# Patient Record
Sex: MALE | Race: WHITE | Employment: FULL TIME | URBAN - METROPOLITAN AREA
[De-identification: names, ages, dates, MRNs, and addresses within clinical notes are randomized per-mention and may not be internally consistent; named-entity substitution may affect disease eponyms.]

---

## 2020-07-28 ENCOUNTER — OFFICE VISIT (OUTPATIENT)
Dept: ORTHOPEDIC SURGERY | Age: 35
End: 2020-07-28
Payer: COMMERCIAL

## 2020-07-28 VITALS — BODY MASS INDEX: 25.84 KG/M2 | HEIGHT: 73 IN | WEIGHT: 195 LBS

## 2020-07-28 PROCEDURE — 99203 OFFICE O/P NEW LOW 30 MIN: CPT | Performed by: PHYSICIAN ASSISTANT

## 2020-07-28 NOTE — PROGRESS NOTES
CHIEF COMPLAINT:    Chief Complaint   Patient presents with    Elbow Injury     RIGHT medial elbow popped last Weds while benching; noticed indentation a few days later and feels posterior elbow has issue as well       HISTORY OF PRESENT ILLNESS:                The patient is a 37 y.o. male who presents to clinic for evaluation of right elbow pain. He had an injury last Wednesday when bench pressing. He states he was pushing the bar and follow-up pop in the elbow. He points to the distal tricep medially as the primary location of his pain. He has noticed a diastases at this area since the injury occurred. Over the past several days he is had some improvement in his pain but has not attempted to return to working out. History reviewed. No pertinent past medical history. The pain assessment was noted & is as follows:  Pain Assessment  Location of Pain: Knee  Location Modifiers: Right, Medial  Severity of Pain: 1  Quality of Pain: Dull  Duration of Pain: Persistent  Frequency of Pain: Intermittent  Aggravating Factors: Bending, Straightening, Other (Comment)(pushing; resisted elbow extension)  Limiting Behavior: Some  Relieving Factors: Rest]      Work Status/Functionality:     Past Medical History: Medical history form was reviewed today & can be found in the media tab  History reviewed. No pertinent past medical history. Past Surgical History:     Past Surgical History:   Procedure Laterality Date    EYE SURGERY      lasix surgery SUDHA eyes    KNEE ARTHROSCOPY       Current Medications:   No current outpatient medications on file. Allergies:  Ether  Social History:    reports that he has never smoked. He has never used smokeless tobacco.  Family History:   History reviewed. No pertinent family history. REVIEW OF SYSTEMS:   For new problems, a full review of systems will be found scanned in the patient's chart.   CONSTITUTIONAL: Denies unexplained weight loss, fevers, chills   NEUROLOGICAL: Denies unsteady gait or progressive weakness  SKIN: Denies skin changes, delayed healing, rash, itching       PHYSICAL EXAM:    Vitals: Height 6' 1\" (1.854 m), weight 195 lb (88.5 kg). GENERAL EXAM:  · General Apparence: Patient is adequately groomed with no evidence of malnutrition. · Orientation: The patient is oriented to time, place and person. · Mood & Affect:The patient's mood and affect are appropriate       Right elbow PHYSICAL EXAMINATION:  · Inspection: He does have a visible diastases at the distal tricep    · Palpation: Tenderness to palpation directly over the olecranon    · Range of Motion: He is able to perform full range of motion of the elbow    · Strength: He is able to resist me with tricep extension. This does reproduce pain    · Special Tests: Neurovascular exam is intact the distal extremity. Negative Tinel's testing at the elbow. · Skin:  There are no rashes, ulcerations or lesions. · There are no dysvascular changes     Gait & station: Normal      Additional Examinations:        Left Upper Extremity: Examination of the left upper extremity does not show any tenderness, deformity or injury. Range of motion is unremarkable. There is no gross instability. There are no rashes, ulcerations or lesions. Strength and tone are normal.      Diagnostic Testing: The following x rays were read and interpreted by myself      1.  3 x-rays of the right elbow were taken today and reveal ossification at the olecranon with no other acute bony abnormalities.     Orders     Orders Placed This Encounter   Procedures    XR ELBOW RIGHT (MIN 3 VIEWS)     Standing Status:   Future     Number of Occurrences:   1     Standing Expiration Date:   8/28/2020    MRI ELBOW RIGHT WO CONTRAST     Standing Status:   Future     Standing Expiration Date:   7/28/2021     Scheduling Instructions:      Ehsan Granados      [phone] [address] [place] [address]; [address] [place] [address] PUSH TO [facility] PACS            PATIENT TO CALL AND SCHEDULE WHEN SCAN APPROVED     Order Specific Question:   Reason for exam:     Answer:   R/O TRICEPS TEAR         Assessment / Treatment Plan:     1. Right elbow triceps strain versus tear    I discussed treatment options with the patient. Given his young age, history of injury with a pop and visible diastases today, I would like to obtain a MRI of the right elbow to rule out a tricep tendon a avulsion. I discussed with the patient I think that he has some fibers attached as he can provide me some resistance. He will follow-up with Dr. Mady Willson or Dr. Author Gr to review the results of his MRI, the extent of his injury and possible treatment options. Rin Thomas, [facility]    This dictation was performed with a verbal recognition program [facility]) and it was checked for errors. It is possible that there are still dictated errors within this office note. If so, please bring any errors to my attention for an addendum. All efforts were made to ensure that this office note is accurate.

## 2020-07-30 ENCOUNTER — TELEPHONE (OUTPATIENT)
Dept: ORTHOPEDIC SURGERY | Age: 35
End: 2020-07-30

## 2020-07-30 NOTE — TELEPHONE ENCOUNTER
LM on patients VM that MRI is approved. Order and Kelly New were faxed to facility, [facility]. Patient may call and schedule.

## 2020-08-19 ENCOUNTER — OFFICE VISIT (OUTPATIENT)
Dept: ORTHOPEDIC SURGERY | Age: 35
End: 2020-08-19
Payer: COMMERCIAL

## 2020-08-19 VITALS — HEIGHT: 73 IN | BODY MASS INDEX: 25.84 KG/M2 | WEIGHT: 195 LBS

## 2020-08-19 PROCEDURE — 99203 OFFICE O/P NEW LOW 30 MIN: CPT | Performed by: ORTHOPAEDIC SURGERY

## 2020-08-19 RX ORDER — CEPHALEXIN 500 MG/1
CAPSULE ORAL
COMMUNITY
Start: 2020-08-13

## 2020-08-19 RX ORDER — ISOTRETINOIN 20 MG/1
CAPSULE, LIQUID FILLED ORAL
COMMUNITY
Start: 2020-08-17

## 2020-08-19 NOTE — PROGRESS NOTES
Chief Complaint  No chief complaint on file. History of Present Illness:  Mary Carmen Boo is a 37 y.o. 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 He subsequently presented to after-hours and subsequently then was evaluated with suspicion for triceps tendon injury. MRI was performed in the interval on 8/6/2020 demonstrating partial thickness distal triceps tendon tear less than 50% thickness with enthesophyte and small avulsed cortical fragment with mild olecranon bursitis and small elbow joint effusion  The patient states that over the last several weeks since his last evaluation he is felt better but is not attempted any forceful extension or weight lifting exercises  No antecedent right elbow pain  No history of gout rheumatoid arthritis or other inflammatory arthropathy  He denies any anabolic steroid use  Medical History  Patient's medications, allergies, past medical, surgical, social and family histories were reviewed and updated as appropriate. Review of Systems  Pertinent items are noted in HPI  Review of systems reviewed from Patient History Form dated on 8/19/20 and available in the patient's chart under the Media tab. Vital Signs  There were no vitals filed for this visit. General Exam:   Constitutional: Patient is adequately groomed with no evidence of malnutrition  Mental Status: The patient is oriented to time, place and person. The patient's mood and affect are appropriate. Lymphatic: The lymphatic examination bilaterally reveals all areas to be without enlargement or induration. Neurological: The patient has good coordination. There is no weakness or sensory deficit.      Right Elbow/right upper extremity examination  Inspection: Minimal swelling near the posterior aspect of the elbow with area of small divot near the posterior medial aspect of the elbow without overlying skin changes  No swelling throughout the remainder of the elbow forearm or wrist    Palpation: Minimal tenderness to palpation at the triceps insertion with palpable gap near the posterior medial aspect of the elbow but no palpable gap spending the remainder of the triceps tendon insertion  Globally nontender throughout the remainder of the elbow    Range of Motion: Gentle elbow range of motion testing today 0 to 135 degrees of flexion without pain or crepitus  75 degrees of pronation and supination without crepitus or pain    Strength: 5 out of 5 strength with elbow flexion and extension including from the position of elbow hyperflexion to extension with gentle testing  5 out of 5 all major muscle groups C5-T1 without deficit    Special Tests: Gross sensation intact median ulnar radial nerve without deficit  Brisk capillary refill all fingers with 2+ palpable radial pulse  Negative Tinel's at cubital tunnel right elbow    Skin: There are no additional worrisome rashes, ulcerations or lesions. Gait: normal    Circulation normal    Additional Comments:     Additional Examinations:  Left Upper Extremity: Examination of the left upper extremity does not show any tenderness, deformity or injury. Range of motion is unremarkable. There is no gross instability. There are no rashes, ulcerations or lesions.   Strength and tone are normal.      Radiology:     X-rays obtained and reviewed in office:  No images obtained today    X-rays were reviewed from 7/20/2020 demonstrating enthesophyte and calcifications adjacent to the triceps insertion with what appears to be fragmentation but no evidence of obvious retracted ministerio of bone throughout the posterior elbow    MRI of the right elbow joint without contrast from 8/6/2020 demonstrating  Superficial partial-thickness tear distal triceps tendon insertion less than 50% thickness with suspicious small avulsed cortical fragment and mild olecranon bursitis and small joint effusion, images personally reviewed by me, please see media tab for full detailed report      Assessment: 15-year-old male presenting with history of right elbow injury while weightlifting  1. Right distal triceps partial thickness tendon tear suspected less than 50% with associated likely underlying calcific tendinopathy    Impression:  No diagnosis found. Office Procedures:  No orders of the defined types were placed in this encounter. Treatment Plan: I spoke with the patient today regarding his injury and images and correlating with his exam.  His exam does demonstrate overall strength without pain with resisted triceps extension today. It appears based on his MRI that he has less than 50% tendon tear with deep fibers intact. We had a long discussion today regarding treatment options including both operative and nonoperative and the risks and benefits of each treatment. Specifically we discussed with nonoperative treatment the need for a period of caution and avoiding any resisted extension over the next several months and referral to therapy which I think will be beneficial for some triceps stretching as well as some modalities for his partial tendon tear as well as education discussing some high risk activities that could lead to further injury. We also discussed the possibility of complete rupture with nonoperative treatment. Operative treatment could be considered based on his lifestyle but he states that he can avoid certain activities and exercises for now. He also understands that this would likely involve creating a complete tear and reattaching the triceps tendon. After thorough discussion the patient would like to pursue nonoperative treatment for now.   I have offered to continue to monitor his progress and follow-up in approximately 4 to 6 weeks for repeat evaluation

## 2020-10-07 ENCOUNTER — TELEMEDICINE (OUTPATIENT)
Dept: ORTHOPEDIC SURGERY | Age: 35
End: 2020-10-07
Payer: COMMERCIAL

## 2020-10-07 PROCEDURE — 99212 OFFICE O/P EST SF 10 MIN: CPT | Performed by: ORTHOPAEDIC SURGERY

## 2020-10-07 NOTE — PROGRESS NOTES
progression of activities including doing some lighter resisted triceps exercises close chain will be most appropriate for him  We did discuss avoiding some of that isolated triceps exercise that causes injury initially including bench press and overhead triceps extension and other types of open chain exercises  For now, patient will continue with otherwise progression of day-to-day activities as tolerated. He understands if he begins to have any new changes discomfort swelling stiffness or any other questions or concerns he will plan to call so that we can follow-up and schedule visit    This visit was completed virtually using [email or web address] me  Patient has verbally accepted the following: We confirm that, for purposes of billing, this is a virtual visit with the provider for which we will submit a claim for reimbursement with the insurance company. The patient accepts responsibility for any co-pays, coinsurance amounts or other amounts not covered by the insurance company. Patient location: Home  Physician location: [facility]    An  electronic signature was used to authenticate this note. --Danitza Adams MD on 10/7/20  12:06 PM         Myah Mata is a 38 y.o. male being evaluated by a Virtual Visit (video visit) encounter to address concerns as mentioned above. A caregiver was present when appropriate. Due to this being a TeleHealth encounter (During [id number]-84 public health emergency), evaluation of the following organ systems was limited: Vitals/Constitutional/EENT/Resp/CV/GI//MS/Neuro/Skin/Heme-Lymph-Imm. Pursuant to the emergency declaration under the [address] [place] [address], [address] [place] [address] authority and the [employer] and [employer] Act, this Virtual Visit was conducted with patient's (and/or legal guardian's) consent, to reduce the patient's risk of exposure to COVID-19 and provide necessary medical care.   The patient (and/or legal guardian) has also been advised to contact this office for worsening conditions or problems, and seek emergency medical treatment and/or call 911 if deemed necessary. Patient identification was verified at the start of the visit: Yes    Total time spent for this encounter: Not billed by time    Services were provided through a video synchronous discussion virtually to substitute for in-person clinic visit. Patient and provider were located at their individual homes. --Marylee Borg, MD on 10/7/2020 at 11:59 AM    An electronic signature was used to authenticate this note.